# Patient Record
Sex: MALE | Race: WHITE | NOT HISPANIC OR LATINO | URBAN - METROPOLITAN AREA
[De-identification: names, ages, dates, MRNs, and addresses within clinical notes are randomized per-mention and may not be internally consistent; named-entity substitution may affect disease eponyms.]

---

## 2018-03-16 ENCOUNTER — APPOINTMENT (OUTPATIENT)
Dept: URBAN - METROPOLITAN AREA CLINIC 219 | Age: 18
Setting detail: DERMATOLOGY
End: 2018-03-20

## 2018-03-16 DIAGNOSIS — L70.0 ACNE VULGARIS: ICD-10-CM

## 2018-03-16 PROCEDURE — OTHER MIPS QUALITY: OTHER

## 2018-03-16 PROCEDURE — OTHER PRESCRIPTION: OTHER

## 2018-03-16 PROCEDURE — OTHER TREATMENT REGIMEN: OTHER

## 2018-03-16 PROCEDURE — 99202 OFFICE O/P NEW SF 15 MIN: CPT

## 2018-03-16 RX ORDER — CLINDAMYCIN PHOSPHATE 10 MG/G
APPLY GEL TOPICAL EVERY MORNING
Qty: 1 | Refills: 5 | Status: ERX | COMMUNITY
Start: 2018-03-16

## 2018-03-16 RX ORDER — ADAPALENE AND BENZOYL PEROXIDE 3; 25 MG/G; MG/G
APPLY GEL TOPICAL AT NIGHT
Qty: 1 | Refills: 5 | Status: ERX | COMMUNITY
Start: 2018-03-16

## 2018-03-16 RX ORDER — DOXYCYCLINE 100 MG/1
TABLET, FILM COATED ORAL ONCE A DAY
Qty: 30 | Refills: 5 | Status: ERX | COMMUNITY
Start: 2018-03-16

## 2018-03-16 NOTE — PROCEDURE: TREATMENT REGIMEN
Initiate Treatment: Wash with Cetaphil Cleanser \\nDoxycycline monohydrate 100 mg once a day with food/water\\nClindamycin gel in the morning \\nEpiduo forte at night
Detail Level: Simple

## 2019-03-13 ENCOUNTER — RX ONLY (RX ONLY)
Age: 19
End: 2019-03-13

## 2019-03-13 RX ORDER — ADAPALENE AND BENZOYL PEROXIDE 3; 25 MG/G; MG/G
APPLY GEL TOPICAL AT NIGHT
Qty: 1 | Refills: 5 | Status: ERX

## 2019-03-13 RX ORDER — CLINDAMYCIN PHOSPHATE 10 MG/G
APPLY GEL TOPICAL EVERY MORNING
Qty: 1 | Refills: 5 | Status: ERX

## 2019-03-13 RX ORDER — DOXYCYCLINE 100 MG/1
TABLET, FILM COATED ORAL ONCE A DAY
Qty: 60 | Refills: 5 | Status: ERX

## 2019-12-27 ENCOUNTER — RX ONLY (RX ONLY)
Age: 19
End: 2019-12-27

## 2019-12-27 RX ORDER — ADAPALENE AND BENZOYL PEROXIDE 3; 25 MG/G; MG/G
APPLY GEL TOPICAL AT NIGHT
Qty: 1 | Refills: 5 | Status: ERX

## 2019-12-27 RX ORDER — CLINDAMYCIN PHOSPHATE 10 MG/G
APPLY GEL TOPICAL EVERY MORNING
Qty: 1 | Refills: 5 | Status: ERX